# Patient Record
Sex: MALE | Race: OTHER | URBAN - METROPOLITAN AREA
[De-identification: names, ages, dates, MRNs, and addresses within clinical notes are randomized per-mention and may not be internally consistent; named-entity substitution may affect disease eponyms.]

---
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
## 2018-02-01 ENCOUNTER — OUTPATIENT (OUTPATIENT)
Dept: OUTPATIENT SERVICES | Facility: HOSPITAL | Age: 18
LOS: 1 days | Discharge: HOME | End: 2018-02-01

## 2018-02-01 DIAGNOSIS — E06.3 AUTOIMMUNE THYROIDITIS: ICD-10-CM

## 2018-02-07 PROBLEM — Z00.00 ENCOUNTER FOR PREVENTIVE HEALTH EXAMINATION: Status: ACTIVE | Noted: 2018-02-07

## 2018-02-14 ENCOUNTER — OUTPATIENT (OUTPATIENT)
Dept: OUTPATIENT SERVICES | Facility: HOSPITAL | Age: 18
LOS: 1 days | Discharge: HOME | End: 2018-02-14

## 2018-02-14 DIAGNOSIS — Z00.00 ENCOUNTER FOR GENERAL ADULT MEDICAL EXAMINATION WITHOUT ABNORMAL FINDINGS: ICD-10-CM

## 2018-02-14 DIAGNOSIS — E55.9 VITAMIN D DEFICIENCY, UNSPECIFIED: ICD-10-CM

## 2018-02-14 DIAGNOSIS — D64.9 ANEMIA, UNSPECIFIED: ICD-10-CM

## 2018-02-20 ENCOUNTER — APPOINTMENT (OUTPATIENT)
Dept: UROLOGY | Facility: CLINIC | Age: 18
End: 2018-02-20
Payer: MEDICAID

## 2018-02-20 VITALS
SYSTOLIC BLOOD PRESSURE: 121 MMHG | DIASTOLIC BLOOD PRESSURE: 69 MMHG | BODY MASS INDEX: 23.14 KG/M2 | WEIGHT: 144 LBS | HEIGHT: 66 IN | HEART RATE: 67 BPM

## 2018-02-20 DIAGNOSIS — Z84.89 FAMILY HISTORY OF OTHER SPECIFIED CONDITIONS: ICD-10-CM

## 2018-02-20 DIAGNOSIS — Z82.5 FAMILY HISTORY OF ASTHMA AND OTHER CHRONIC LOWER RESPIRATORY DISEASES: ICD-10-CM

## 2018-02-20 DIAGNOSIS — E05.90 THYROTOXICOSIS, UNSPECIFIED W/OUT THYROTOXIC CRISIS OR STORM: ICD-10-CM

## 2018-02-20 DIAGNOSIS — Z82.61 FAMILY HISTORY OF ARTHRITIS: ICD-10-CM

## 2018-02-20 DIAGNOSIS — Z78.9 OTHER SPECIFIED HEALTH STATUS: ICD-10-CM

## 2018-02-20 PROCEDURE — 99203 OFFICE O/P NEW LOW 30 MIN: CPT

## 2018-03-13 ENCOUNTER — LABORATORY RESULT (OUTPATIENT)
Age: 18
End: 2018-03-13

## 2018-03-16 LAB
APPEARANCE: ABNORMAL
BILIRUBIN URINE: NEGATIVE
BLOOD URINE: NEGATIVE
COLOR: YELLOW
GLUCOSE QUALITATIVE U: NEGATIVE MG/DL
KETONES URINE: NEGATIVE
LEUKOCYTE ESTERASE URINE: NEGATIVE
NITRITE URINE: NEGATIVE
PH URINE: 5.5
PROTEIN URINE: NEGATIVE MG/DL
SPECIFIC GRAVITY URINE: 1.03
UROBILINOGEN URINE: NEGATIVE MG/DL

## 2018-03-19 ENCOUNTER — APPOINTMENT (OUTPATIENT)
Dept: UROLOGY | Facility: CLINIC | Age: 18
End: 2018-03-19
Payer: MEDICAID

## 2018-03-19 VITALS
WEIGHT: 144 LBS | SYSTOLIC BLOOD PRESSURE: 126 MMHG | BODY MASS INDEX: 23.14 KG/M2 | HEIGHT: 66 IN | HEART RATE: 66 BPM | DIASTOLIC BLOOD PRESSURE: 65 MMHG

## 2018-03-19 DIAGNOSIS — Z71.89 OTHER SPECIFIED COUNSELING: ICD-10-CM

## 2018-03-19 PROCEDURE — 99213 OFFICE O/P EST LOW 20 MIN: CPT

## 2018-03-19 RX ORDER — LEVOTHYROXINE SODIUM 0.07 MG/1
75 TABLET ORAL
Refills: 0 | Status: ACTIVE | COMMUNITY

## 2018-03-19 NOTE — REVIEW OF SYSTEMS
[see HPI] : see HPI [Negative] : Heme/Lymph [Dysuria] : no dysuria [Incontinence] : no incontinence [Nocturia] : no nocturia

## 2018-03-19 NOTE — PHYSICAL EXAM
[General Appearance - Well Developed] : well developed [General Appearance - Well Nourished] : well nourished [Normal Appearance] : normal appearance [Well Groomed] : well groomed [General Appearance - In No Acute Distress] : no acute distress [Abdomen Soft] : soft [Abdomen Tenderness] : non-tender [Costovertebral Angle Tenderness] : no ~M costovertebral angle tenderness [Edema] : no peripheral edema [] : no respiratory distress [Respiration, Rhythm And Depth] : normal respiratory rhythm and effort [Exaggerated Use Of Accessory Muscles For Inspiration] : no accessory muscle use [Oriented To Time, Place, And Person] : oriented to person, place, and time [Affect] : the affect was normal [Mood] : the mood was normal [Not Anxious] : not anxious [Normal Station and Gait] : the gait and station were normal for the patient's age [No Focal Deficits] : no focal deficits [Urethral Meatus] : meatus normal [Scrotum] : the scrotum was normal [Skin Color & Pigmentation] : normal skin color and pigmentation

## 2018-03-19 NOTE — LETTER BODY
[Dear  ___] : Dear  [unfilled], [I had the pleasure of evaluating your patient, [unfilled]. Thank you for referring this patient for consultation for ___] : I had the pleasure of evaluating your patient, [unfilled]. Thank you for referring this patient for consultation for [unfilled]. [Attached please find my note.] : Attached please find my note. [Thank you very much for allowing me to participate in the care of this patient. If you have any questions, please do not hesitate to contact me] : Thank you very much for allowing me to participate in the care of this patient. If you have any questions, please do not hesitate to contact me. [FreeTextEntry2] : Dr. Smyth Cereb\par [address] [place] [address]\par [place] [id number]

## 2018-03-19 NOTE — ASSESSMENT
[FreeTextEntry1] : EDDY CHA is a 17 year old male with a past medical history of hypothyroid . Presents to the office today for a follow up, last seen on 2/20/2018. Patient states that right testicular pain resolved on its own since last visit. Denies urological issues at this time, dysuria, hematuria, and flank pain. US performed and patient is here to review results. UA shows specific gravity 1.033 (1.010-1.025) and turbid urine appearance, otherwise negative leukocytes, nitrates ,and no signs of blood. \par \par -US 2/28/2018\par -3 mm epididymal head cyst on the right \par -7 mm epididymal head cyst on the left \par -No hydrocele/No varicocele

## 2018-03-19 NOTE — HISTORY OF PRESENT ILLNESS
[None] : None [FreeTextEntry1] : EDDY CHA is a 17 year old male with a past medical history of hypothyroid . Presents to the office today for a follow up, last seen on 2/20/2018. Patient states that right testicular pain resolved on its own since last visit. Denies urological issues at this time, dysuria, hematuria, and flank pain. US performed and patient is here to review results. UA shows specific gravity 1.033 (1.010-1.025) and turbid urine appearance, otherwise negative leukocytes, nitrates ,and no signs of blood. \par \par -US 2/28/2018\par -3 mm epididymal head cyst on the right \par -7 mm epididymal head cyst on the left \par -No hydrocele/No varicocele

## 2018-10-25 ENCOUNTER — OUTPATIENT (OUTPATIENT)
Dept: OUTPATIENT SERVICES | Facility: HOSPITAL | Age: 18
LOS: 1 days | Discharge: HOME | End: 2018-10-25

## 2018-10-25 DIAGNOSIS — N39.0 URINARY TRACT INFECTION, SITE NOT SPECIFIED: ICD-10-CM

## 2018-10-25 DIAGNOSIS — D64.9 ANEMIA, UNSPECIFIED: ICD-10-CM

## 2018-10-25 DIAGNOSIS — E55.9 VITAMIN D DEFICIENCY, UNSPECIFIED: ICD-10-CM

## 2018-10-25 DIAGNOSIS — Z00.00 ENCOUNTER FOR GENERAL ADULT MEDICAL EXAMINATION WITHOUT ABNORMAL FINDINGS: ICD-10-CM

## 2018-10-25 DIAGNOSIS — E78.5 HYPERLIPIDEMIA, UNSPECIFIED: ICD-10-CM

## 2019-01-22 ENCOUNTER — EMERGENCY (EMERGENCY)
Facility: HOSPITAL | Age: 19
LOS: 0 days | Discharge: HOME | End: 2019-01-22
Attending: EMERGENCY MEDICINE | Admitting: EMERGENCY MEDICINE

## 2019-01-22 VITALS
OXYGEN SATURATION: 98 % | TEMPERATURE: 100 F | RESPIRATION RATE: 19 BRPM | DIASTOLIC BLOOD PRESSURE: 59 MMHG | SYSTOLIC BLOOD PRESSURE: 122 MMHG | HEART RATE: 84 BPM

## 2019-01-22 DIAGNOSIS — J45.909 UNSPECIFIED ASTHMA, UNCOMPLICATED: ICD-10-CM

## 2019-01-22 DIAGNOSIS — K92.1 MELENA: ICD-10-CM

## 2019-01-22 DIAGNOSIS — E03.9 HYPOTHYROIDISM, UNSPECIFIED: ICD-10-CM

## 2019-01-22 DIAGNOSIS — Z79.899 OTHER LONG TERM (CURRENT) DRUG THERAPY: ICD-10-CM

## 2019-01-22 DIAGNOSIS — Z98.890 OTHER SPECIFIED POSTPROCEDURAL STATES: ICD-10-CM

## 2019-01-22 DIAGNOSIS — K60.2 ANAL FISSURE, UNSPECIFIED: ICD-10-CM

## 2019-01-22 DIAGNOSIS — Z98.890 OTHER SPECIFIED POSTPROCEDURAL STATES: Chronic | ICD-10-CM

## 2019-01-22 RX ORDER — LEVOTHYROXINE SODIUM 125 MCG
1 TABLET ORAL
Qty: 0 | Refills: 0 | COMMUNITY

## 2019-01-22 NOTE — ED PROVIDER NOTE - NS ED ROS FT
Constitutional:  No fevers or chills.  Eyes:  No visual changes, eye pain, or discharge.  ENT:  No hearing changes. No ear pain or sore throat.  Neck:  No neck pain or stiffness.  Cardiac:  No CP or edema.  Resp:  No cough or SOB. No hemoptysis.   GI:  No nausea, vomiting, diarrhea, or abdominal pain.  :  No dysuria, frequency, or hematuria.  MSK:  No myalgias or joint pain/swelling.  Neuro:  No headache, dizziness, or weakness.  Skin:  No skin rash. Constitutional:  No fevers or chills.  Eyes:  No visual changes.  ENT:  No sore throat.  Neck:  No neck pain.  Cardiac:  No CP.  Resp:  No cough or SOB.  GI:  No nausea, vomiting, diarrhea, or abdominal pain. +Bloody stool/blood on paper/blood mixed in with stool.  :  No dysuria, frequency, hematuria, testicular pain, or penile lesions/discharge.   MSK:  No myalgias or joint pain/swelling.  Neuro:  No headache, dizziness, or weakness.  Skin:  No skin rash.

## 2019-01-22 NOTE — ED PROVIDER NOTE - ATTENDING CONTRIBUTION TO CARE
18yr male hx of asthma has some blood when wiping for two days no abd pain no nausea no emesis no sig hard stools didn't put foreign object   VS reviewed, stable.  Gen: interactive, well appearing, no acute distress  HEENT: NC/AT, TM non bulging bl no evidence of mastoiditis,  moist mucus membranes, pupils equal, responsive, reactive to light and accomodation, no conjunctivitis or scleral icterus; no nasal discharge .   OP no exudates no erythema  Neck: FROM, supple, no cervical LAD  Chest: CTA b/l, no crackles/wheezes, good air entry, no tachypnea or retractions  CV: regular rate and rhythm, no murmurs   Abd: soft, nontender, nondistended, no HSM appreciated, +BS  rectal exam  plan- 18yr male hx of asthma has some blood when wiping for two days no abd pain no nausea no emesis no sig hard stools didn't put foreign object   VS reviewed, stable.  Gen: interactive, well appearing, no acute distress  HEENT: NC/AT, TM non bulging bl no evidence of mastoiditis,  moist mucus membranes, pupils equal, responsive, reactive to light and accomodation, no conjunctivitis or scleral icterus; no nasal discharge .   OP no exudates no erythema  Neck: FROM, supple, no cervical LAD  Chest: CTA b/l, no crackles/wheezes, good air entry, no tachypnea or retractions  CV: regular rate and rhythm, no murmurs   Abd: soft, nontender, nondistended, no HSM appreciated, +BS  rectal exam=+anal fissure at 6pm  plan- patient with anal fissure, stool softener recommended and lubrication in the area

## 2019-01-22 NOTE — ED PROVIDER NOTE - PROGRESS NOTE DETAILS
Rectal exam revealed anal fissures. Encouraged patient to eat high fiber diet and to apply H/H or cream to affected area.

## 2019-01-22 NOTE — ED PROVIDER NOTE - NSFOLLOWUPINSTRUCTIONS_ED_ALL_ED_FT
Please follow-up with your doctor. Return to ED for any severe abdominal pain, uncontrolled vomiting/diarrhea, uncontrolled fevers, testicular pain, or blood in urine.

## 2019-01-22 NOTE — ED PROVIDER NOTE - PHYSICAL EXAMINATION
PHYSICAL EXAM: I have reviewed current vital signs.  GENERAL: NAD, well-nourished; well-developed.  HEAD:  Normocephalic, atraumatic.  EYES: Conjunctiva and sclera clear.  ENT: MMM, no erythema/exudates.  NECK: Supple, full ROM.  CHEST/LUNG: Clear to auscultation bilaterally; no wheezes, rales, or rhonchi.  HEART: Regular rate and rhythm, normal S1 and S2; no murmurs, rubs, or gallops.  ABDOMEN: Soft, nontender, nondistended.  /rectal exam: Chaperone present- Dr. Agosto.  EXTREMITIES:  2+ peripheral pulses; FROM.  NEUROLOGY: A&O x 3. Motor 5/5.   SKIN: No rashes or lesions. PHYSICAL EXAM: I have reviewed current vital signs.  GENERAL: NAD, well-nourished; well-developed.  HEAD:  Normocephalic, atraumatic.  EYES: Conjunctiva and sclera clear.  ENT: MMM, no erythema/exudates.  NECK: Supple, full ROM.  CHEST/LUNG: Clear to auscultation bilaterally; no wheezes, rales, or rhonchi.  HEART: Regular rate and rhythm, normal S1 and S2; no murmurs, rubs, or gallops.  ABDOMEN: Soft, nontender, nondistended.  /rectal exam: Chaperone present- Dr. Agosto. Patient with small superficial anal fissures, no hemorrhoids, masses, or visible external lesions.  EXTREMITIES:  2+ peripheral pulses; FROM.  NEUROLOGY: A&O x 3. Motor 5/5.   SKIN: Warm and dry.

## 2019-01-22 NOTE — ED PROVIDER NOTE - MEDICAL DECISION MAKING DETAILS
18yr male with bloody stools secondary to anal fissures most likely secondary to hard stools  ED evaluation and management discussed with the  of the patient in detail.  Close PMD follow up encouraged.  Strict ED return instructions discussed in detail and patient was given the opportunity to ask any questions about their discharge diagnosis and instructions. Patient verbalized understanding.

## 2019-01-22 NOTE — ED PROVIDER NOTE - OBJECTIVE STATEMENT
17yo M with PMH of hypothyroidism and asthma presenting with bloody stool x 2 days. Patient states there is blood when he wipes and mixed in with his stool, no frequent episodes of diarrhea. Denies any f/c, vomiting, CP, SOB, abdominal pain, back pain, testicular pain/penile discharge/lesions, or hematuria/dysuria/frequency. Sexually active with women, last sexual intercourse was 2 weeks ago, no history of STIs and no concern for STIs. No hx of constipation, occasionally strains but not too much. 17yo M with PMH of hypothyroidism and asthma presenting with bloody stool x 2 days. Patient states there is blood when he wipes and mixed in with his stool, no frequent episodes of diarrhea. Denies any f/c, vomiting, CP, SOB, abdominal pain, back pain, testicular pain/penile discharge/lesions, or hematuria/dysuria/frequency. Sexually active with women, last sexual intercourse was 2 weeks ago, no history of STIs and no concern for STIs. No hx of constipation, occasionally strains but not too much. Did not insert anything in rectum.

## 2019-05-24 PROBLEM — E03.9 HYPOTHYROIDISM, UNSPECIFIED: Chronic | Status: ACTIVE | Noted: 2019-01-22

## 2019-05-24 PROBLEM — J45.909 UNSPECIFIED ASTHMA, UNCOMPLICATED: Chronic | Status: ACTIVE | Noted: 2019-01-22

## 2019-06-05 ENCOUNTER — FORM ENCOUNTER (OUTPATIENT)
Age: 19
End: 2019-06-05

## 2019-06-05 ENCOUNTER — APPOINTMENT (OUTPATIENT)
Dept: UROLOGY | Facility: CLINIC | Age: 19
End: 2019-06-05
Payer: MEDICAID

## 2019-06-05 VITALS
SYSTOLIC BLOOD PRESSURE: 114 MMHG | WEIGHT: 144 LBS | HEIGHT: 66 IN | BODY MASS INDEX: 23.14 KG/M2 | HEART RATE: 75 BPM | DIASTOLIC BLOOD PRESSURE: 65 MMHG

## 2019-06-05 PROCEDURE — 99213 OFFICE O/P EST LOW 20 MIN: CPT

## 2019-06-05 NOTE — PHYSICAL EXAM
[General Appearance - Well Developed] : well developed [General Appearance - Well Nourished] : well nourished [Well Groomed] : well groomed [Normal Appearance] : normal appearance [Abdomen Soft] : soft [General Appearance - In No Acute Distress] : no acute distress [Costovertebral Angle Tenderness] : no ~M costovertebral angle tenderness [Abdomen Tenderness] : non-tender [Urethral Meatus] : meatus normal [Scrotum] : the scrotum was normal [Skin Color & Pigmentation] : normal skin color and pigmentation [Edema] : no peripheral edema [] : no respiratory distress [Respiration, Rhythm And Depth] : normal respiratory rhythm and effort [Oriented To Time, Place, And Person] : oriented to person, place, and time [Exaggerated Use Of Accessory Muscles For Inspiration] : no accessory muscle use [Affect] : the affect was normal [Not Anxious] : not anxious [Mood] : the mood was normal [Normal Station and Gait] : the gait and station were normal for the patient's age [No Focal Deficits] : no focal deficits

## 2019-06-05 NOTE — HISTORY OF PRESENT ILLNESS
[FreeTextEntry1] : EDDY CHA is a 18 year old male with a past medical history of hypothyroid. Patient states that right testicular pain has returned since last visit occurs for a few minutes about twice per week. pain is 5/10.  pain doesn’t stop his everyday life. Denies urological issues at this time, dysuria, hematuria, and flank pain. US performed and patient is here to review results. UA shows specific gravity 1.033 (1.010-1.025) and turbid urine appearance, otherwise negative leukocytes, nitrates ,and no signs of blood. \par \par -US 2/28/2018\par -3 mm epididymal head cyst on the right \par -7 mm epididymal head cyst on the left \par -No hydrocele/No varicocele.

## 2019-06-06 ENCOUNTER — OUTPATIENT (OUTPATIENT)
Dept: OUTPATIENT SERVICES | Facility: HOSPITAL | Age: 19
LOS: 1 days | Discharge: HOME | End: 2019-06-06
Payer: MEDICAID

## 2019-06-06 DIAGNOSIS — N50.819 TESTICULAR PAIN, UNSPECIFIED: ICD-10-CM

## 2019-06-06 DIAGNOSIS — Z98.890 OTHER SPECIFIED POSTPROCEDURAL STATES: Chronic | ICD-10-CM

## 2019-06-06 PROCEDURE — 76870 US EXAM SCROTUM: CPT | Mod: 26

## 2019-07-17 ENCOUNTER — APPOINTMENT (OUTPATIENT)
Dept: UROLOGY | Facility: CLINIC | Age: 19
End: 2019-07-17
Payer: MEDICAID

## 2019-07-17 DIAGNOSIS — N50.819 TESTICULAR PAIN, UNSPECIFIED: ICD-10-CM

## 2019-07-17 DIAGNOSIS — I86.1 SCROTAL VARICES: ICD-10-CM

## 2019-07-17 DIAGNOSIS — N50.3 CYST OF EPIDIDYMIS: ICD-10-CM

## 2019-07-17 PROCEDURE — 99213 OFFICE O/P EST LOW 20 MIN: CPT

## 2019-07-17 NOTE — PHYSICAL EXAM
[General Appearance - Well Developed] : well developed [General Appearance - Well Nourished] : well nourished [Normal Appearance] : normal appearance [Well Groomed] : well groomed [General Appearance - In No Acute Distress] : no acute distress [Abdomen Soft] : soft [Abdomen Tenderness] : non-tender [Costovertebral Angle Tenderness] : no ~M costovertebral angle tenderness [Urethral Meatus] : meatus normal [Scrotum] : the scrotum was normal [Skin Color & Pigmentation] : normal skin color and pigmentation [Edema] : no peripheral edema [] : no respiratory distress [Respiration, Rhythm And Depth] : normal respiratory rhythm and effort [Exaggerated Use Of Accessory Muscles For Inspiration] : no accessory muscle use [Oriented To Time, Place, And Person] : oriented to person, place, and time [Affect] : the affect was normal [Mood] : the mood was normal [Not Anxious] : not anxious [Normal Station and Gait] : the gait and station were normal for the patient's age [No Focal Deficits] : no focal deficits

## 2019-07-17 NOTE — ASSESSMENT
[FreeTextEntry1] : EDDY CHA is a 18 year old male with a past medical history of hypothyroid. Patient states that right testicular pain has returned since last visit occurs for a few minutes about twice per week. pain is 5/10. pain doesn’t stop his everyday life. Denies urological issues at this time, dysuria, hematuria, and flank pain. US performed and patient is here to review results. UA shows specific gravity 1.033 (1.010-1.025) and turbid urine appearance, otherwise negative leukocytes, nitrates ,and no signs of blood. \par \par -US 2/28/2018\par -3 mm epididymal head cyst on the right \par -7 mm epididymal head cyst on the left \par -No hydrocele/No varicocele. \par \par June 2019\par US Doppler Scrotum;\par unremarkable appearance of the testes\par borderline bilateral varicocele\par bilateral small epididymal cysts

## 2020-06-18 PROBLEM — N50.819 ORCHALGIA: Status: ACTIVE | Noted: 2018-02-20

## 2024-08-03 ENCOUNTER — NON-APPOINTMENT (OUTPATIENT)
Age: 24
End: 2024-08-03

## 2024-08-03 ENCOUNTER — APPOINTMENT (OUTPATIENT)
Dept: ORTHOPEDIC SURGERY | Facility: CLINIC | Age: 24
End: 2024-08-03

## 2024-08-03 VITALS — HEIGHT: 67 IN | WEIGHT: 146 LBS | BODY MASS INDEX: 22.91 KG/M2

## 2024-08-03 DIAGNOSIS — S39.012A STRAIN OF MUSCLE, FASCIA AND TENDON OF LOWER BACK, INITIAL ENCOUNTER: ICD-10-CM

## 2024-08-03 PROCEDURE — 72110 X-RAY EXAM L-2 SPINE 4/>VWS: CPT

## 2024-08-03 PROCEDURE — 20552 NJX 1/MLT TRIGGER POINT 1/2: CPT

## 2024-08-03 PROCEDURE — 99203 OFFICE O/P NEW LOW 30 MIN: CPT | Mod: ACP,25

## 2024-08-03 RX ORDER — TIZANIDINE 4 MG/1
4 TABLET ORAL
Qty: 30 | Refills: 0 | Status: ACTIVE | COMMUNITY
Start: 2024-08-03 | End: 1900-01-01

## 2024-08-03 RX ORDER — DICLOFENAC SODIUM 75 MG/1
75 TABLET, DELAYED RELEASE ORAL
Qty: 60 | Refills: 0 | Status: ACTIVE | COMMUNITY
Start: 2024-08-03 | End: 1900-01-01

## 2024-08-03 NOTE — DISCUSSION/SUMMARY
[de-identified] : Impression: Lumbar strain  Plan: Patient was advised for muscle relaxant, anti-inflammatories and physical therapy, prescription was given. Patient was offered a trigger point injection, patient agrees. EDDY was offered a trigger point injection to right and left lumbar paraspinals muscle spasm, He agreed, after verbal consent, in a sterile technique using alcohol and ethyl chloride 1 cc of 1% lidocaine was infiltrated over his right and left muscle spasm, he tolerated well, bandaid was placed over the puncture site.  At this time patient has total temporary disability unable to return to work. Patient was given a note to stay out of work for 2 weeks.   Follow-up: 2 weeks for repeat evaluation.

## 2024-08-03 NOTE — IMAGING
[de-identified] : Examination of the lumbar spine, patient appears to be very uncomfortable, difficult the setting, he felt no relief standing. Patient has a muscle spasm over the right lower aspect of the lumbar paraspinal muscles and on the left lumbar paraspinous muscles as well. Patient unable to bend or extend his lower back due to pain. Severe pain with slight rotation of the lumbar spine. Patient able to do active straight leg raise.  Radiographs of the lumbar spine with 4 views presenting office today, there is mild curvature of the lumbar spine possibly due to muscle spasm. Patient has straightening of the normal lordosis of the lumbar spine.  No acute fracture or dislocation.

## 2024-08-03 NOTE — HISTORY OF PRESENT ILLNESS
[de-identified] : 23-year-old male here for evaluation of pain to the lumbar spine, patient states that this pain is started today August 30, 2024, patient states that he works for  [employer], and he was lifting boxes and he started to develop pain on his back, he continue with his work, he could not continue lifting boxes, and he developed pain with ambulation and standing.

## 2024-08-19 ENCOUNTER — NON-APPOINTMENT (OUTPATIENT)
Age: 24
End: 2024-08-19

## 2024-08-19 ENCOUNTER — APPOINTMENT (OUTPATIENT)
Dept: ORTHOPEDIC SURGERY | Facility: CLINIC | Age: 24
End: 2024-08-19
Payer: OTHER MISCELLANEOUS

## 2024-08-19 DIAGNOSIS — S39.012D STRAIN OF MUSCLE, FASCIA AND TENDON OF LOWER BACK, SUBSEQUENT ENCOUNTER: ICD-10-CM

## 2024-08-19 PROCEDURE — 99213 OFFICE O/P EST LOW 20 MIN: CPT | Mod: ACP

## 2024-08-19 NOTE — HISTORY OF PRESENT ILLNESS
[de-identified] : ORIGINAL PRESENTATION:  23-year-old male here for evaluation of pain to the lumbar spine, patient states that this pain is started today August 3, 2024, patient states that he works for  [employer], and he was lifting boxes and he started to develop pain on his back, he continue with his work, he could not continue lifting boxes, and he developed pain with ambulation and standing.  TODAY: I had the pleasure of seeing Mr. Ki Vargas today in follow-up.  His previous history and physical findings have been reviewed.  He is under our care for lumbar pain status post work injury that occurred on August 3, 2024.  He works at  [employer] and states he was lifting boxes when he felt a lot of pain in his lower back.  He denied any radicular component at that time and was seen in the walk-in where he was provided a prescription for physical therapy as well as a trigger point injection.  He states that there has been at least 50% improvement as he just feels some stiffness and achiness in the lower back.  He does feel that the physical therapy is helping with his range of motion as well and he would like to continue this over the next few weeks.  He is also using anti-inflammatory muscle relaxant as needed with relief.  He presents today to see if he can return to work light duty and I will evaluate him further at this time.

## 2024-08-19 NOTE — IMAGING
[de-identified] : LUMBAR SPINE: Negative straight leg raise bilaterally, tenderness palpation of the lumbar paraspinal muscles, 5 out of 5 strength in lower extremities, 2+ reflexes, full range of motion with flexion.

## 2024-08-19 NOTE — ASSESSMENT
[FreeTextEntry1] : 23-year-old male with lumbar pain/strain status post work injury.  He will continue to attend physical therapy and I have cleared him to return to work light duty which includes no heavy lifting, pushing, pulling.  He will follow-up in 4 weeks for reevaluation and is aware if there is any issue he can contact me in the office and he verbalized understanding and agreement.  He will continue to use diclofenac and tizanidine as needed and does not require refill at this time.

## 2024-09-16 ENCOUNTER — NON-APPOINTMENT (OUTPATIENT)
Age: 24
End: 2024-09-16

## 2024-09-16 ENCOUNTER — APPOINTMENT (OUTPATIENT)
Dept: ORTHOPEDIC SURGERY | Facility: CLINIC | Age: 24
End: 2024-09-16
Payer: OTHER MISCELLANEOUS

## 2024-09-16 DIAGNOSIS — S39.012D STRAIN OF MUSCLE, FASCIA AND TENDON OF LOWER BACK, SUBSEQUENT ENCOUNTER: ICD-10-CM

## 2024-09-16 PROCEDURE — 99213 OFFICE O/P EST LOW 20 MIN: CPT | Mod: ACP

## 2024-09-16 NOTE — IMAGING
[de-identified] : LUMBAR SPINE: Negative straight leg raise bilaterally, no tenderness palpation of the lumbar paraspinal muscles, 5 out of 5 strength in lower extremities, 2+ reflexes, full range of motion with flexion.

## 2024-09-16 NOTE — HISTORY OF PRESENT ILLNESS
[de-identified] : ORIGINAL PRESENTATION:  23-year-old male here for evaluation of pain to the lumbar spine, patient states that this pain is started today August 3, 2024, patient states that he works for  [employer], and he was lifting boxes and he started to develop pain on his back, he continue with his work, he could not continue lifting boxes, and he developed pain with ambulation and standing.  TODAY: I had the pleasure of seeing Mr. Ki Vargas today in follow-up.  His previous history and physical findings have been reviewed.  He is under our care for lumbar pain status post work injury that occurred on August 3, 2024.  He is doing much better with respect to his pain stating he has been working light duty for the past 4 weeks without issue.  He states he will experience discomfort at times with certain activities but states he otherwise feels back to normal.  He is not currently taking anything for pain and denies any loss of motion.  I will therefore evaluate him further today to return to work full duty and he is agreeable as well.

## 2024-09-16 NOTE — ASSESSMENT
[FreeTextEntry1] : 23-year-old male with lumbar pain/strain status post work injury that has resolved.  He will f/u as needed and return to work full duty as of Monday, September 23, 2024 without restriction.  He is aware if there is any issue he can contact the office and he verbalizes his understanding and agreement.